# Patient Record
Sex: FEMALE | Race: WHITE | NOT HISPANIC OR LATINO | Employment: FULL TIME | URBAN - METROPOLITAN AREA
[De-identification: names, ages, dates, MRNs, and addresses within clinical notes are randomized per-mention and may not be internally consistent; named-entity substitution may affect disease eponyms.]

---

## 2023-06-16 ENCOUNTER — OFFICE VISIT (OUTPATIENT)
Dept: PRIMARY CARE | Facility: CLINIC | Age: 33
End: 2023-06-16
Payer: COMMERCIAL

## 2023-06-16 VITALS
SYSTOLIC BLOOD PRESSURE: 124 MMHG | WEIGHT: 165.8 LBS | HEART RATE: 74 BPM | BODY MASS INDEX: 30.33 KG/M2 | DIASTOLIC BLOOD PRESSURE: 77 MMHG

## 2023-06-16 DIAGNOSIS — L30.9 DERMATITIS, UNSPECIFIED: Primary | ICD-10-CM

## 2023-06-16 PROBLEM — G43.109 MIGRAINE WITH VISUAL AURA: Status: ACTIVE | Noted: 2023-06-16

## 2023-06-16 PROBLEM — E73.9 LACTOSE INTOLERANCE: Status: RESOLVED | Noted: 2023-06-16 | Resolved: 2023-06-16

## 2023-06-16 PROBLEM — N92.6 MENSTRUAL IRREGULARITY: Status: RESOLVED | Noted: 2023-06-16 | Resolved: 2023-06-16

## 2023-06-16 PROBLEM — B35.9 RINGWORM: Status: ACTIVE | Noted: 2019-05-10

## 2023-06-16 PROBLEM — D17.22 LIPOMA OF LEFT AXILLA: Status: RESOLVED | Noted: 2023-06-16 | Resolved: 2023-06-16

## 2023-06-16 PROBLEM — E55.9 VITAMIN D DEFICIENCY: Status: ACTIVE | Noted: 2023-06-16

## 2023-06-16 PROCEDURE — 99213 OFFICE O/P EST LOW 20 MIN: CPT | Performed by: INTERNAL MEDICINE

## 2023-06-16 PROCEDURE — 1036F TOBACCO NON-USER: CPT | Performed by: INTERNAL MEDICINE

## 2023-06-16 RX ORDER — DROSPIRENONE 4 MG/1
1 TABLET, FILM COATED ORAL DAILY
COMMUNITY

## 2023-06-16 RX ORDER — ONDANSETRON 4 MG/1
TABLET, ORALLY DISINTEGRATING ORAL EVERY 8 HOURS PRN
COMMUNITY

## 2023-06-16 RX ORDER — CLOTRIMAZOLE AND BETAMETHASONE DIPROPIONATE 10; .64 MG/G; MG/G
1 CREAM TOPICAL 2 TIMES DAILY
Qty: 45 G | Refills: 1 | Status: SHIPPED | OUTPATIENT
Start: 2023-06-16 | End: 2023-08-15

## 2023-06-16 RX ORDER — HYOSCYAMINE SULFATE 0.12 MG/1
TABLET, ORALLY DISINTEGRATING ORAL
COMMUNITY
Start: 2021-08-16

## 2023-06-16 ASSESSMENT — ENCOUNTER SYMPTOMS
FEVER: 0
ACTIVITY CHANGE: 0

## 2023-06-16 NOTE — PROGRESS NOTES
Charlotte Augustin comes in today for recurrent rash.      Charlotte comes in today with a recurrent rash.  She is concerned that this may be ringworm related.  There are 2 small spots on her right inner thigh, scar on her right posterior thigh, and pinpoint lesions on her left inner thigh.  She has found that when these lesions flare, they are very itchy, flaky and red.  They never truly resolved completely.  She has been using Lotrimin spray and powder over-the-counter twice daily.  She is showering twice a day with antifungal soap.  She did have a prescription over 1 year ago but does not recall what this was.  She is hoping that we can find something to more definitively treat this.    Rash  Pertinent negatives include no fever.       Review of Systems   Constitutional:  Negative for activity change and fever.   Skin:  Positive for rash.       Objective   Physical Exam  Constitutional:       Appearance: Normal appearance.   Skin:     Findings: Lesion and rash present.      Comments: Small lesions, possibly eczematous versus fungal component.  1 cm in diameter on the inner right thigh, no active lesions on the left.   Neurological:      Mental Status: She is alert.         Assessment/Plan   1.  Nonspecific rash: May have fungal component, but may be eczematous.  We will treat with clotrimazole betamethasone to see if can get more definitive treatment.  If this persists, we will refer to dermatology.  She will contact us with an update especially if symptoms persisting or worsening.  She is to call with any additional questions or concerns.  Otherwise, we will keep up with her routine wellness visits when due.  Problem List Items Addressed This Visit    None

## 2023-06-19 ENCOUNTER — TELEPHONE (OUTPATIENT)
Dept: PRIMARY CARE | Facility: CLINIC | Age: 33
End: 2023-06-19
Payer: COMMERCIAL

## 2023-06-19 NOTE — TELEPHONE ENCOUNTER
Pt requesting ointment you sent be changed to lotion insurance requested change for it to be covered

## 2023-07-21 ENCOUNTER — TELEPHONE (OUTPATIENT)
Dept: PRIMARY CARE | Facility: CLINIC | Age: 33
End: 2023-07-21

## 2023-07-23 DIAGNOSIS — B35.9 RINGWORM: Primary | ICD-10-CM

## 2023-07-23 DIAGNOSIS — L30.9 DERMATITIS, UNSPECIFIED: ICD-10-CM

## 2023-09-18 PROBLEM — R19.8 IRREGULAR BOWEL HABITS: Status: ACTIVE | Noted: 2023-09-18

## 2023-09-18 RX ORDER — PHENYLPROPANOLAMINE/CLEMASTINE 75-1.34MG
TABLET, EXTENDED RELEASE ORAL
COMMUNITY

## 2023-09-18 RX ORDER — CLOTRIMAZOLE AND BETAMETHASONE DIPROPIONATE 10; .64 MG/G; MG/G
1 CREAM TOPICAL
COMMUNITY

## 2023-10-18 ENCOUNTER — OFFICE VISIT (OUTPATIENT)
Dept: DERMATOLOGY | Facility: CLINIC | Age: 33
End: 2023-10-18
Payer: COMMERCIAL

## 2023-10-18 DIAGNOSIS — L30.9 DERMATITIS, UNSPECIFIED: ICD-10-CM

## 2023-10-18 DIAGNOSIS — B35.9 RINGWORM: ICD-10-CM

## 2023-10-18 DIAGNOSIS — R21 RASH AND OTHER NONSPECIFIC SKIN ERUPTION: Primary | ICD-10-CM

## 2023-10-18 RX ORDER — TRIAMCINOLONE ACETONIDE 1 MG/G
OINTMENT TOPICAL 2 TIMES DAILY
Qty: 80 G | Refills: 3 | Status: SHIPPED | OUTPATIENT
Start: 2023-10-18

## 2023-10-18 NOTE — PROGRESS NOTES
Subjective     Charlotte Augustin is a 33 y.o. female who presents for the following:  rash    She reports that this seemed to start out like ringworm and seemed to respond to treatments initially (clotrimazole-betamethasone cream)    Worst on elbows currently. Often in rings. Has had this 4 years. Itchy at times. Worse when she is ill.    She reports that when its bad she washed up to 3 times daily, uses antifungal and antibacterial products.    Review of Systems:  No other skin or systemic complaints other than what is documented elsewhere in the note.    The following portions of the chart were reviewed this encounter and updated as appropriate:       Specialty Problems          Dermatology Problems    Ringworm     Past Medical History:  Charlotte Augustin  has a past medical history of Anxiety (Panic attacks), BRBPR (bright red blood per rectum), COVID-19 (02/09/2021), Eczema (Mostly in winter & on hands), Gastro-esophageal reflux disease without esophagitis (06/13/2013), Lactose intolerance, Lipoma of left axilla (06/16/2023), Menstrual irregularity (06/16/2023), Migraine, unspecified, not intractable, without status migrainosus (06/13/2013), and Ophthalmoplegic migraine, 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    Past Surgical History:  Charlotte Augustin  has a past surgical history that includes [place] tooth extraction (2 tooth implants & knee surgery meniscus) and Knee arthroscopy w/ meniscal repair.    Family History:  Patient family history includes Cancer in her mother's sister; Colon cancer in her paternal grandmother; Colonic polyp in her maternal great-grandmother; Hypertension in her brother; No Known Problems in her mother; Ovarian cancer in her maternal grandmother.    Social History:  Charlotte Augustin  reports that she has never smoked. She has never used smokeless tobacco. She reports current alcohol use of about 1.0 standard drink of alcohol per week. She reports that she does not use drugs.    Allergies:  Lactose    Current  Medications / CAM's:    Current Outpatient Medications:     clotrimazole-betamethasone (Lotrisone) cream, Apply 1 Application topically., Disp: , Rfl:     hyoscyamine 0.125 mg disintegrating tablet, Take by mouth., Disp: , Rfl:     ibuprofen (Motrin) capsule, Take 1 capsule by mouth four times daily as needed., Disp: , Rfl:     ondansetron ODT (Zofran-ODT) 4 mg disintegrating tablet, Take by mouth every 8 hours if needed., Disp: , Rfl:     Slynd 4 mg (28) tablet, Take 1 tablet by mouth once daily., Disp: , Rfl:     triamcinolone (Kenalog) 0.1 % ointment, Apply topically 2 times a day. To affected areas on body, Disp: 80 g, Rfl: 3     Objective   Well appearing patient in no apparent distress; mood and affect are within normal limits.    A full examination was performed including scalp, head, eyes, ears, nose, lips, neck, chest, axillae, abdomen, back, buttocks, bilateral upper extremities, bilateral lower extremities, hands, feet, fingers, toes, fingernails, and toenails. All findings within normal limits unless otherwise noted below.    Left Elbow - Posterior, Right Elbow - Posterior  - relatively well-defined erythematous scaly patches         Assessment/Plan   Rash and other nonspecific skin eruption  Left Elbow - Posterior; Right Elbow - Posterior    - Diagnosis and treatment options reviewed - Ddx includes psoriasis (possibly annular psoriasis in prior lesions), cannot rule out a component of tinea corporis as there are no annular lesions on exam today (reports history of these on and off). Lesions are partially treated with the clotrimazole-betamethasone cream from PCP, so further diagnostic workup is likely to be unrevealing at this time  - START triamcinolone 0.1% ointment to affected areas on body twice daily for 2 weeks, then only on weekends as needed  - Will have patient follow-up in 1 month, consider biopsy, KOH at next visit as indicated. Discussed that if this flares off antifungal, that will give us  useful information and we may consider PO terbinafine in the future    triamcinolone (Kenalog) 0.1 % ointment - Left Elbow - Posterior, Right Elbow - Posterior  Apply topically 2 times a day. To affected areas on body    Related Procedures  Follow Up In Dermatology - Established Patient         Ayde Hernández MD

## 2024-01-05 ENCOUNTER — APPOINTMENT (OUTPATIENT)
Dept: DERMATOLOGY | Facility: CLINIC | Age: 34
End: 2024-01-05
Payer: COMMERCIAL

## 2024-06-24 ENCOUNTER — APPOINTMENT (OUTPATIENT)
Dept: DERMATOLOGY | Facility: CLINIC | Age: 34
End: 2024-06-24
Payer: COMMERCIAL

## 2024-07-18 DIAGNOSIS — Z30.011 ENCOUNTER FOR INITIAL PRESCRIPTION OF CONTRACEPTIVE PILLS: Primary | ICD-10-CM

## 2024-07-18 RX ORDER — DROSPIRENONE 4 MG/1
1 TABLET, FILM COATED ORAL DAILY
Qty: 84 TABLET | Refills: 3 | Status: SHIPPED | OUTPATIENT
Start: 2024-07-18

## 2024-10-12 ENCOUNTER — OFFICE VISIT (OUTPATIENT)
Dept: URGENT CARE | Age: 34
End: 2024-10-12
Payer: COMMERCIAL

## 2024-10-12 VITALS
HEART RATE: 94 BPM | HEIGHT: 63 IN | DIASTOLIC BLOOD PRESSURE: 86 MMHG | SYSTOLIC BLOOD PRESSURE: 130 MMHG | WEIGHT: 160 LBS | TEMPERATURE: 98.4 F | BODY MASS INDEX: 28.35 KG/M2 | OXYGEN SATURATION: 96 %

## 2024-10-12 DIAGNOSIS — R68.89 FLU-LIKE SYMPTOMS: Primary | ICD-10-CM

## 2024-10-12 DIAGNOSIS — B34.9 VIRAL INFECTION: ICD-10-CM

## 2024-10-12 LAB
POC RAPID INFLUENZA A: NEGATIVE
POC RAPID INFLUENZA B: NEGATIVE
POC SARS-COV-2 AG BINAX: NORMAL

## 2024-10-12 RX ORDER — BROMPHENIRAMINE MALEATE, PSEUDOEPHEDRINE HYDROCHLORIDE, AND DEXTROMETHORPHAN HYDROBROMIDE 2; 30; 10 MG/5ML; MG/5ML; MG/5ML
10 SYRUP ORAL 4 TIMES DAILY PRN
Qty: 250 ML | Refills: 0 | Status: SHIPPED | OUTPATIENT
Start: 2024-10-12

## 2024-10-12 RX ORDER — AZITHROMYCIN 250 MG/1
TABLET, FILM COATED ORAL
Qty: 6 TABLET | Refills: 0 | Status: SHIPPED | OUTPATIENT
Start: 2024-10-12

## 2024-10-12 ASSESSMENT — ENCOUNTER SYMPTOMS: SORE THROAT: 1

## 2024-10-12 NOTE — PROGRESS NOTES
Subjective   Patient ID: Charlotte Augustin is a 34 y.o. female. They present today with a chief complaint of Sore Throat (With low grade fever for 48 hours. ).    History of Present Illness  Patient is a very pleasant 34-year-old white female, no significant past medical history, presented to clinic with complaint of flulike symptoms.  Patient is reporting approximately 6-day history of generalized weakness, fatigue, body aches, upper respiratory congestion, rhinorrhea, and dry cough.  She does report some subjective fevers at home.  Denies any chest pain or shortness of breath.  No abdominal pain, nausea, vomiting.  No numbness, tingling, or focal weakness.  States she has been using DayQuil and NyQuil at home with only mild short-term relief.  States she was overall beginning to feel better this past midweek however states her symptoms then returned and gotten worse and over the past couple of days.        Sore Throat         Past Medical History  Allergies as of 10/12/2024 - Reviewed 10/12/2024   Allergen Reaction Noted    Lactose Unknown 09/18/2023       (Not in a hospital admission)         Past Medical History:   Diagnosis Date    Anxiety Panic attacks    BRBPR (bright red blood per rectum)     COVID-19 02/09/2021    COVID-19 virus infection    Eczema Mostly in winter & on hands    Gastro-esophageal reflux disease without esophagitis 06/13/2013    Esophageal reflux    Lactose intolerance     Lipoma of left axilla 06/16/2023    Menstrual irregularity 06/16/2023    Migraine, unspecified, not intractable, without status migrainosus 06/13/2013    Migraine headache    Ophthalmoplegic migraine, not intractable 01/22/2014    Migraine, ophthalmoplegic       Past Surgical History:   Procedure Laterality Date    KNEE ARTHROSCOPY W/ MENISCAL REPAIR      WISDOM TOOTH EXTRACTION  2 tooth implants & knee surgery meniscus        reports that she has never smoked. She has never been exposed to tobacco smoke. She has never used  "smokeless tobacco. She reports current alcohol use of about 1.0 standard drink of alcohol per week. She reports that she does not use drugs.    Review of Systems  Review of Systems   HENT:  Positive for sore throat.      All review of systems negative unless stated in HPI.                              Objective    Vitals:    10/12/24 1136   BP: 130/86   BP Location: Left arm   Patient Position: Sitting   BP Cuff Size: Adult   Pulse: 94   Temp: 36.9 °C (98.4 °F)   TempSrc: Oral   SpO2: 96%   Weight: 72.6 kg (160 lb)   Height: 1.6 m (5' 3\")     No LMP recorded (lmp unknown).    Physical Exam  General: Vitals Noted. No distress. Normocephalic.     HEENT: TMs normal, EOMI, normal conjunctiva, patent nares with erythematous edematous and appear nasal turbinates and clear rhinorrhea bilaterally.  Posterior oropharynx with signs of postnasal drainage without any erythema swelling or tonsillar exudate.  Uvula is in the midline and nonedematous.  No drooling.  No trismus.    Neck: Supple with no adenopathy.     Cardiac: Regular Rate and Rhythm. No murmur.     Pulmonary: Equal breath sounds bilaterally. No wheezes, rhonchi, or rales.    Abdomen: Soft, non-tender, with normal bowel sounds.     Musculoskeletal: Moves all extremities, no effusion, no edema.     Skin: No obvious rashes.  Procedures    Point of Care Test & Imaging Results from this visit    No results found.    Diagnostic study results (if any) were reviewed by Mekhi Yuen PA-C.    Assessment/Plan   Allergies, medications, history, and pertinent labs/EKGs/Imaging reviewed by Mekhi Yuen PA-C.     Medical Decision Making  Patient was seen evaluate clinic with complaint of flulike symptoms.  On exam patient is nontoxic well-appearing respect comfortably no acute distress.  Vital signs are stable, afebrile.  Chest is clear, heart is regular, belly is soft and nontender.  ENT exam as above consistent with a viral illness.  Rapid COVID-19 and " influenza testing was performed all of which is negative.  Plan to discharge patient home in stable condition with instructions for supportive care including plenty of rest, hydration, time ibuprofen every 6 hours as needed.  Provided prescription for Bromfed-DM to take as needed for cough and congestion.  I did also provide a Z-Arpan however advised to watch weight and monitor symptoms if not improving the next 3 to 4 days she can initiate the antibiotic as needed.  I reviewed my impression, plan, strict return precautions with the patient.  She expresses understanding and agreement plan of care.    Orders and Diagnoses  Diagnoses and all orders for this visit:  Flu-like symptoms  -     POCT Influenza A/B manually resulted  -     POCT Covid-19 Rapid Antigen  Viral infection  -     azithromycin (Zithromax) 250 mg tablet; Take 2 tablets for one day then 1 tablet per day for 4 days  -     brompheniramine-pseudoeph-DM 2-30-10 mg/5 mL syrup; Take 10 mL by mouth 4 times a day as needed for congestion or cough.        Medical Admin Record      Follow Up Instructions  No follow-ups on file.    Patient disposition: Home    Electronically signed by Mekhi Yuen PA-C  11:50 AM

## 2025-04-13 ASSESSMENT — PROMIS GLOBAL HEALTH SCALE
RATE_MENTAL_HEALTH: GOOD
RATE_QUALITY_OF_LIFE: VERY GOOD
RATE_AVERAGE_FATIGUE: MODERATE
RATE_AVERAGE_PAIN: 1
EMOTIONAL_PROBLEMS: OFTEN
RATE_PHYSICAL_HEALTH: GOOD
RATE_GENERAL_HEALTH: GOOD
RATE_SOCIAL_SATISFACTION: GOOD
CARRYOUT_PHYSICAL_ACTIVITIES: MOSTLY
CARRYOUT_SOCIAL_ACTIVITIES: VERY GOOD

## 2025-04-17 ENCOUNTER — APPOINTMENT (OUTPATIENT)
Dept: PRIMARY CARE | Facility: CLINIC | Age: 35
End: 2025-04-17
Payer: COMMERCIAL

## 2025-04-17 VITALS — WEIGHT: 176.4 LBS | BODY MASS INDEX: 31.25 KG/M2 | HEIGHT: 63 IN

## 2025-04-17 DIAGNOSIS — Z00.00 ROUTINE GENERAL MEDICAL EXAMINATION AT A HEALTH CARE FACILITY: Primary | ICD-10-CM

## 2025-04-17 PROBLEM — B35.9 RINGWORM: Status: RESOLVED | Noted: 2019-05-10 | Resolved: 2025-04-17

## 2025-04-17 PROCEDURE — 99395 PREV VISIT EST AGE 18-39: CPT | Performed by: INTERNAL MEDICINE

## 2025-04-17 PROCEDURE — 1036F TOBACCO NON-USER: CPT | Performed by: INTERNAL MEDICINE

## 2025-04-17 PROCEDURE — 3008F BODY MASS INDEX DOCD: CPT | Performed by: INTERNAL MEDICINE

## 2025-04-17 RX ORDER — TRIAMCINOLONE ACETONIDE 1 MG/G
CREAM TOPICAL
COMMUNITY
Start: 2025-01-22

## 2025-04-17 ASSESSMENT — ENCOUNTER SYMPTOMS
FATIGUE: 0
PALPITATIONS: 0
SORE THROAT: 0
WHEEZING: 0
NERVOUS/ANXIOUS: 0
FREQUENCY: 0
EYE ITCHING: 0
COLOR CHANGE: 0
MYALGIAS: 0
HYPERACTIVE: 0
DYSURIA: 0
SEIZURES: 0
CONSTIPATION: 0
VOICE CHANGE: 0
APPETITE CHANGE: 0
RHINORRHEA: 0
BRUISES/BLEEDS EASILY: 0
DIARRHEA: 0
DIFFICULTY URINATING: 0
DIZZINESS: 0
DYSPHORIC MOOD: 0
NECK PAIN: 0
ABDOMINAL DISTENTION: 0
HEADACHES: 0
EYE DISCHARGE: 0
LIGHT-HEADEDNESS: 0
FEVER: 0
NAUSEA: 0
SINUS PAIN: 0
SLEEP DISTURBANCE: 0
HEMATURIA: 0
WEAKNESS: 0
ARTHRALGIAS: 0
ACTIVITY CHANGE: 0
CHEST TIGHTNESS: 0
SHORTNESS OF BREATH: 0
NECK STIFFNESS: 0
ADENOPATHY: 0
CHILLS: 0
BACK PAIN: 0
COUGH: 0
SINUS PRESSURE: 0
ABDOMINAL PAIN: 0
VOMITING: 0
DECREASED CONCENTRATION: 0

## 2025-04-17 NOTE — PATIENT INSTRUCTIONS
We will follow up on all comprehensive blood work once results are available and make any recommendations based on these results as may be indicated.  Please continue with routine gynecologic exams.  Thank you for keeping up with routine vaccines.  Technically your tetanus/whooping cough vaccine is due, but you could consider waiting until you are pregnant as they will likely give you 1 during your third trimester.  If you do have any injury with any raulito metal especially, please come in and have this updated in the interim.  Please consider switching from your multivitamin to a prenatal vitamin.  If you have any questions, please feel free to contact us.  Otherwise, we are happy to see you back annually for your wellness visits.

## 2025-04-17 NOTE — PROGRESS NOTES
Subjective   Patient ID: Charlotte Augustin is a 34 y.o. female who presents for Annual Exam.    Charlotte comes in today for a comprehensive physical exam.  She was last seen about 2 years ago.  She does follow with her gynecologist regularly.  She recently got .  She is thinking about family-planning, possibly within the next year or so trying to conceive.  She continues to follow with her GI specialist as well in regards to her IBS symptoms.  She is remaining essentially stable with this.  She is continuing currently just on a multivitamin, birth control pill, and the as needed hycosamine for her IBS.  She denies any headaches, dizziness, chest pain or palpitations, shortness of breath nor cough, nausea, vomiting, nor acute change in bowel nor bladder habits.         Review of Systems   Constitutional:  Negative for activity change, appetite change, chills, fatigue and fever.   HENT:  Negative for congestion, ear pain, postnasal drip, rhinorrhea, sinus pressure, sinus pain, sneezing, sore throat, tinnitus and voice change.    Eyes:  Negative for discharge, itching and visual disturbance.   Respiratory:  Negative for cough, chest tightness, shortness of breath and wheezing.    Cardiovascular:  Negative for chest pain, palpitations and leg swelling.   Gastrointestinal:  Negative for abdominal distention, abdominal pain, constipation, diarrhea, nausea and vomiting.        IBS manageable and stable.   Endocrine: Negative for cold intolerance, heat intolerance and polyuria.   Genitourinary:  Negative for difficulty urinating, dysuria, frequency, hematuria, urgency, vaginal bleeding and vaginal discharge.   Musculoskeletal:  Negative for arthralgias, back pain, myalgias, neck pain and neck stiffness.   Skin:  Negative for color change, pallor and rash.   Allergic/Immunologic: Negative for environmental allergies, food allergies and immunocompromised state.   Neurological:  Negative for dizziness, seizures, syncope,  "weakness, light-headedness and headaches.   Hematological:  Negative for adenopathy. Does not bruise/bleed easily.   Psychiatric/Behavioral:  Negative for behavioral problems, decreased concentration, dysphoric mood and sleep disturbance. The patient is not nervous/anxious and is not hyperactive.        Objective   Ht 1.6 m (5' 3\")   Wt 80 kg (176 lb 6.4 oz)   BMI 31.25 kg/m²     Physical Exam  Constitutional:       General: She is not in acute distress.     Appearance: Normal appearance. She is well-developed. She is not ill-appearing.   HENT:      Head: Normocephalic.      Right Ear: Tympanic membrane, ear canal and external ear normal.      Left Ear: Tympanic membrane, ear canal and external ear normal.      Nose: Nose normal. No congestion.      Mouth/Throat:      Mouth: Mucous membranes are moist.      Pharynx: Oropharynx is clear. No oropharyngeal exudate or posterior oropharyngeal erythema.   Eyes:      General: Lids are normal. No scleral icterus.     Extraocular Movements: Extraocular movements intact.      Conjunctiva/sclera: Conjunctivae normal.      Pupils: Pupils are equal, round, and reactive to light.   Neck:      Vascular: No carotid bruit.   Cardiovascular:      Rate and Rhythm: Normal rate and regular rhythm.      Pulses: Normal pulses.      Heart sounds: No murmur heard.     No gallop.   Pulmonary:      Effort: Pulmonary effort is normal. No respiratory distress.      Breath sounds: No wheezing, rhonchi or rales.   Chest:      Comments: Deferred to gynecology  Abdominal:      General: Bowel sounds are normal. There is no distension.      Palpations: Abdomen is soft. There is no mass.      Tenderness: There is no abdominal tenderness. There is no guarding or rebound.   Genitourinary:     Comments: Deferred to gynecology  Musculoskeletal:         General: No swelling or signs of injury.      Cervical back: Normal range of motion and neck supple. No tenderness.      Right lower leg: No edema.      " Left lower leg: No edema.   Lymphadenopathy:      Cervical: No cervical adenopathy.   Skin:     General: Skin is warm and dry.      Coloration: Skin is not pale.      Findings: No bruising or rash.   Neurological:      General: No focal deficit present.      Mental Status: She is alert and oriented to person, place, and time.      Cranial Nerves: No cranial nerve deficit.      Motor: No weakness.      Coordination: Coordination normal.      Gait: Gait normal.   Psychiatric:         Mood and Affect: Mood normal.         Behavior: Behavior normal.         Thought Content: Thought content normal.         Judgment: Judgment normal.         Assessment/Plan     Full age-appropriate comprehensive physical exam and health care maintenance performed and discussed today.  Routine safety and preventative measures discussed including self breast exam, seatbelt use, no drinking and driving, no texting and driving, abstinence or cessation of tobacco use, routine dental and vision exams, healthy diet and regular exercise.    We will update comprehensive labs and follow-up on results once available.  Gynecologic exam/Pap smear up-to-date through gynecology.  All other screening will start age-appropriate times in the future.  Keeps up with flu shots and COVID boosters.  Due for tetanus, but if considering conceiving in the next year, could consider waiting until pregnancy.    Have counseled regarding switching to a prenatal vitamin.  Happy to see her back essentially annually.  She is to contact us with any questions.

## 2025-04-18 LAB
25(OH)D3+25(OH)D2 SERPL-MCNC: 21 NG/ML (ref 30–100)
ALBUMIN SERPL-MCNC: 4.6 G/DL (ref 3.6–5.1)
ALP SERPL-CCNC: 68 U/L (ref 31–125)
ALT SERPL-CCNC: 13 U/L (ref 6–29)
ANION GAP SERPL CALCULATED.4IONS-SCNC: 9 MMOL/L (CALC) (ref 7–17)
AST SERPL-CCNC: 13 U/L (ref 10–30)
BASOPHILS # BLD AUTO: 71 CELLS/UL (ref 0–200)
BASOPHILS NFR BLD AUTO: 0.9 %
BILIRUB SERPL-MCNC: 0.4 MG/DL (ref 0.2–1.2)
BUN SERPL-MCNC: 12 MG/DL (ref 7–25)
CALCIUM SERPL-MCNC: 9.5 MG/DL (ref 8.6–10.2)
CHLORIDE SERPL-SCNC: 105 MMOL/L (ref 98–110)
CHOLEST SERPL-MCNC: 209 MG/DL
CHOLEST/HDLC SERPL: 3.4 (CALC)
CO2 SERPL-SCNC: 23 MMOL/L (ref 20–32)
CREAT SERPL-MCNC: 0.6 MG/DL (ref 0.5–0.97)
EGFRCR SERPLBLD CKD-EPI 2021: 121 ML/MIN/1.73M2
EOSINOPHIL # BLD AUTO: 237 CELLS/UL (ref 15–500)
EOSINOPHIL NFR BLD AUTO: 3 %
ERYTHROCYTE [DISTWIDTH] IN BLOOD BY AUTOMATED COUNT: 12.9 % (ref 11–15)
EST. AVERAGE GLUCOSE BLD GHB EST-MCNC: 114 MG/DL
EST. AVERAGE GLUCOSE BLD GHB EST-SCNC: 6.3 MMOL/L
GLUCOSE SERPL-MCNC: 78 MG/DL (ref 65–99)
HBA1C MFR BLD: 5.6 %
HCT VFR BLD AUTO: 44.2 % (ref 35–45)
HDLC SERPL-MCNC: 61 MG/DL
HGB BLD-MCNC: 14.3 G/DL (ref 11.7–15.5)
IRON SATN MFR SERPL: 20 % (CALC) (ref 16–45)
IRON SERPL-MCNC: 77 MCG/DL (ref 40–190)
LDLC SERPL CALC-MCNC: 132 MG/DL (CALC)
LYMPHOCYTES # BLD AUTO: 2828 CELLS/UL (ref 850–3900)
LYMPHOCYTES NFR BLD AUTO: 35.8 %
MCH RBC QN AUTO: 28 PG (ref 27–33)
MCHC RBC AUTO-ENTMCNC: 32.4 G/DL (ref 32–36)
MCV RBC AUTO: 86.5 FL (ref 80–100)
MONOCYTES # BLD AUTO: 514 CELLS/UL (ref 200–950)
MONOCYTES NFR BLD AUTO: 6.5 %
NEUTROPHILS # BLD AUTO: 4250 CELLS/UL (ref 1500–7800)
NEUTROPHILS NFR BLD AUTO: 53.8 %
NONHDLC SERPL-MCNC: 148 MG/DL (CALC)
PLATELET # BLD AUTO: 373 THOUSAND/UL (ref 140–400)
PMV BLD REES-ECKER: 9.8 FL (ref 7.5–12.5)
POTASSIUM SERPL-SCNC: 4.2 MMOL/L (ref 3.5–5.3)
PROT SERPL-MCNC: 7.9 G/DL (ref 6.1–8.1)
RBC # BLD AUTO: 5.11 MILLION/UL (ref 3.8–5.1)
SODIUM SERPL-SCNC: 137 MMOL/L (ref 135–146)
TIBC SERPL-MCNC: 379 MCG/DL (CALC) (ref 250–450)
TRIGL SERPL-MCNC: 65 MG/DL
TSH SERPL-ACNC: 1.04 MIU/L
VIT B12 SERPL-MCNC: 396 PG/ML (ref 200–1100)
WBC # BLD AUTO: 7.9 THOUSAND/UL (ref 3.8–10.8)

## 2025-04-21 ENCOUNTER — TELEPHONE (OUTPATIENT)
Facility: CLINIC | Age: 35
End: 2025-04-21
Payer: COMMERCIAL

## 2025-04-21 DIAGNOSIS — Z30.011 ENCOUNTER FOR INITIAL PRESCRIPTION OF CONTRACEPTIVE PILLS: ICD-10-CM

## 2025-04-21 NOTE — TELEPHONE ENCOUNTER
Pt is calling to have her Birth control refilled.     ANNETTE WALTON MA    Patient calling because birth control still has not been refilled. Would like a call back

## 2025-04-23 RX ORDER — DROSPIRENONE 4 MG/1
1 TABLET, FILM COATED ORAL DAILY
Qty: 84 TABLET | Refills: 0 | Status: SHIPPED | OUTPATIENT
Start: 2025-04-23

## 2025-04-23 NOTE — TELEPHONE ENCOUNTER
Contacted pt  Name and  verified  Spoke with pt made aware provider may be reluctant to refill due to the time in between appts  Told her I requested enough until her next appt but it providers discretion  Pt verbalized understanding.  No further questions or concerns at this time.

## 2025-05-19 ENCOUNTER — APPOINTMENT (OUTPATIENT)
Dept: OBSTETRICS AND GYNECOLOGY | Facility: CLINIC | Age: 35
End: 2025-05-19
Payer: COMMERCIAL

## 2025-05-28 ENCOUNTER — APPOINTMENT (OUTPATIENT)
Facility: CLINIC | Age: 35
End: 2025-05-28
Payer: COMMERCIAL

## 2025-05-28 VITALS
DIASTOLIC BLOOD PRESSURE: 68 MMHG | HEIGHT: 63 IN | SYSTOLIC BLOOD PRESSURE: 106 MMHG | BODY MASS INDEX: 31.01 KG/M2 | WEIGHT: 175 LBS

## 2025-05-28 DIAGNOSIS — Z12.4 CERVICAL CANCER SCREENING: ICD-10-CM

## 2025-05-28 DIAGNOSIS — Z01.419 ENCOUNTER FOR ANNUAL ROUTINE GYNECOLOGICAL EXAMINATION: Primary | ICD-10-CM

## 2025-05-28 PROCEDURE — 88175 CYTOPATH C/V AUTO FLUID REDO: CPT

## 2025-05-28 PROCEDURE — 3008F BODY MASS INDEX DOCD: CPT | Performed by: OBSTETRICS & GYNECOLOGY

## 2025-05-28 PROCEDURE — 1036F TOBACCO NON-USER: CPT | Performed by: OBSTETRICS & GYNECOLOGY

## 2025-05-28 PROCEDURE — 87626 HPV SEP HI-RSK TYP&POOL RSLT: CPT

## 2025-05-28 PROCEDURE — 99395 PREV VISIT EST AGE 18-39: CPT | Performed by: OBSTETRICS & GYNECOLOGY

## 2025-05-28 ASSESSMENT — PAIN SCALES - GENERAL: PAINLEVEL_OUTOF10: 0-NO PAIN

## 2025-05-28 ASSESSMENT — ENCOUNTER SYMPTOMS
COLOR CHANGE: 0
BLOOD IN STOOL: 0
ABDOMINAL DISTENTION: 0
FEVER: 0
UNEXPECTED WEIGHT CHANGE: 0
VOMITING: 0
APPETITE CHANGE: 0
FLANK PAIN: 0
NAUSEA: 0
ABDOMINAL PAIN: 0
HEMATURIA: 0
DYSURIA: 0
DIARRHEA: 0
BACK PAIN: 0
CHILLS: 0
SHORTNESS OF BREATH: 0
SLEEP DISTURBANCE: 0
FATIGUE: 0
FREQUENCY: 0
CONSTIPATION: 0

## 2025-05-28 ASSESSMENT — PATIENT HEALTH QUESTIONNAIRE - PHQ9
1. LITTLE INTEREST OR PLEASURE IN DOING THINGS: NOT AT ALL
SUM OF ALL RESPONSES TO PHQ9 QUESTIONS 1 AND 2: 0
2. FEELING DOWN, DEPRESSED OR HOPELESS: NOT AT ALL

## 2025-05-28 NOTE — PROGRESS NOTES
"Charlotte Augustin is a 34 y.o.  here for well woman exam.    Medical and surgical histories reviewed with patient.     Concerns: none ; thinking about TTC in the next 1-2 years     Exercise: regular  Works as an .    GynHx:  Cycles: none with Slynd   Sexually active: Yes with one male partner/   Contraception: POP  No LMP recorded (lmp unknown). (Menstrual status: OCP).     OB History          0    Para   0    Term   0       0    AB   0    Living   0         SAB   0    IAB   0    Ectopic   0    Multiple   0    Live Births   0                 Objective     Review of Systems   Constitutional:  Negative for appetite change, chills, fatigue, fever and unexpected weight change.   Respiratory:  Negative for shortness of breath.    Cardiovascular:  Negative for chest pain.   Gastrointestinal:  Negative for abdominal distention, abdominal pain, blood in stool, constipation, diarrhea, nausea and vomiting.   Endocrine: Negative for cold intolerance and heat intolerance.   Genitourinary:  Negative for dyspareunia, dysuria, flank pain, frequency, genital sores, hematuria, menstrual problem, pelvic pain, urgency, vaginal bleeding, vaginal discharge and vaginal pain.   Musculoskeletal:  Negative for back pain.   Skin:  Negative for color change.   Psychiatric/Behavioral:  Negative for sleep disturbance.        /68 (BP Location: Left arm, Patient Position: Sitting)   Ht 1.6 m (5' 3\")   Wt 79.4 kg (175 lb)   LMP  (LMP Unknown)   BMI 31.00 kg/m²     Physical Exam  Constitutional:       Appearance: Normal appearance.   HENT:      Head: Normocephalic and atraumatic.   Chest:   Breasts:     Right: Normal.      Left: Normal.   Abdominal:      General: Abdomen is flat.      Palpations: Abdomen is soft.      Tenderness: There is no abdominal tenderness.   Genitourinary:     General: Normal vulva.      Vagina: Normal.      Cervix: Normal.      Uterus: Normal.       Adnexa: Right adnexa normal and " left adnexa normal.      Comments: Pap collected today    Skin:     General: Skin is warm and dry.   Neurological:      Mental Status: She is alert and oriented to person, place, and time.   Psychiatric:         Mood and Affect: Mood normal.          Assessment and Plan:  Routine Well Woman Exam Today.   Discussed diet and exercise and routine health screening.   Pap: pap collected today     Discussed preconception counseling. Recommend starting PNV about one month prior to stopping birth control method.  Pt aware she can contact office for Rx if desired.  Continue to exercise regularly, ideally at least 30 minutes at a time , 5 days a week. Continue with healthy balanced diet.   Reviewed medications and no concerns regarding current medications and pregnancy.         No orders of the defined types were placed in this encounter.

## 2025-06-02 ENCOUNTER — TELEPHONE (OUTPATIENT)
Facility: CLINIC | Age: 35
End: 2025-06-02
Payer: COMMERCIAL

## 2025-06-02 DIAGNOSIS — Z30.011 ENCOUNTER FOR INITIAL PRESCRIPTION OF CONTRACEPTIVE PILLS: ICD-10-CM

## 2025-06-02 NOTE — TELEPHONE ENCOUNTER
Slynd 4 mg (28) tablet       [facility]/PHARMACY #[id number] - [facility], [place], [place] - [address] AT [facility] [[id number]]

## 2025-06-02 NOTE — TELEPHONE ENCOUNTER
Pharmacy called and said that the slynd is not covered by her insurance, either a new birth control script needs sent or an PA needs done.

## 2025-06-03 RX ORDER — DROSPIRENONE 4 MG/1
TABLET, FILM COATED ORAL
Qty: 84 TABLET | Refills: 3 | Status: SHIPPED | OUTPATIENT
Start: 2025-06-03

## 2025-06-03 NOTE — TELEPHONE ENCOUNTER
Pharmacy states the patient has no refills for the Slynd BC sent in on 5-2-25. I spoke with pharmacy and they made me aware there are no refills or prescription in the system for this patient. Are we able to resend his prescription?   [facility] [phone]

## 2025-06-08 LAB
CYTOLOGY CMNT CVX/VAG CYTO-IMP: NORMAL
HPV HR 12 DNA GENITAL QL NAA+PROBE: NEGATIVE
HPV HR GENOTYPES PNL CVX NAA+PROBE: NEGATIVE
HPV16 DNA SPEC QL NAA+PROBE: NEGATIVE
HPV18 DNA SPEC QL NAA+PROBE: NEGATIVE
LAB AP HPV GENOTYPE QUESTION: YES
LAB AP HPV HR: NORMAL
LABORATORY COMMENT REPORT: NORMAL
PATH REPORT.TOTAL CANCER: NORMAL

## 2025-08-07 ENCOUNTER — TELEPHONE (OUTPATIENT)
Facility: CLINIC | Age: 35
End: 2025-08-07
Payer: COMMERCIAL

## 2025-08-07 NOTE — TELEPHONE ENCOUNTER
Patient needs prior auth for Slynd. She spoke with her insurance company and they asked that she call the office. They said to either use cover my meds or call in at [phone]    Kaia Starks MA

## 2025-08-08 NOTE — TELEPHONE ENCOUNTER
Pt is following up:    Patient needs prior auth for Slynd. She spoke with her insurance company and they asked that she call the office. They said to either use cover my meds or call in at [phone]

## 2026-05-29 ENCOUNTER — APPOINTMENT (OUTPATIENT)
Facility: CLINIC | Age: 36
End: 2026-05-29
Payer: COMMERCIAL